# Patient Record
Sex: MALE | Race: ASIAN | URBAN - METROPOLITAN AREA
[De-identification: names, ages, dates, MRNs, and addresses within clinical notes are randomized per-mention and may not be internally consistent; named-entity substitution may affect disease eponyms.]

---

## 2018-08-06 ENCOUNTER — APPOINTMENT (RX ONLY)
Dept: URBAN - METROPOLITAN AREA CLINIC 81 | Facility: CLINIC | Age: 33
Setting detail: DERMATOLOGY
End: 2018-08-06

## 2018-08-06 DIAGNOSIS — L26 EXFOLIATIVE DERMATITIS: ICD-10-CM

## 2018-08-06 PROBLEM — I10 ESSENTIAL (PRIMARY) HYPERTENSION: Status: ACTIVE | Noted: 2018-08-06

## 2018-08-06 PROBLEM — L29.8 OTHER PRURITUS: Status: ACTIVE | Noted: 2018-08-06

## 2018-08-06 PROCEDURE — 99202 OFFICE O/P NEW SF 15 MIN: CPT

## 2018-08-06 PROCEDURE — ? COUNSELING

## 2018-08-06 ASSESSMENT — LOCATION DETAILED DESCRIPTION DERM
LOCATION DETAILED: LEFT RADIAL PALM
LOCATION DETAILED: LEFT MEDIAL PLANTAR MIDFOOT
LOCATION DETAILED: RIGHT ULNAR PALM
LOCATION DETAILED: RIGHT MEDIAL PLANTAR MIDFOOT

## 2018-08-06 ASSESSMENT — LOCATION ZONE DERM
LOCATION ZONE: FEET
LOCATION ZONE: HAND

## 2018-08-06 ASSESSMENT — LOCATION SIMPLE DESCRIPTION DERM
LOCATION SIMPLE: LEFT HAND
LOCATION SIMPLE: RIGHT HAND
LOCATION SIMPLE: RIGHT PLANTAR SURFACE
LOCATION SIMPLE: LEFT PLANTAR SURFACE

## 2018-08-06 NOTE — PROCEDURE: COUNSELING
Patient Specific Counseling (Will Not Stick From Patient To Patient): Recommend Dove Sensitive Skin soap. Samples Cerave and Eucerin moisturizer given. Sample Impoyz given to help calm down inflammation. Patient to use it maximum of twice a day. Patient recommended to get cotton gloves and put good layer of Vaseline and moisturizer at night to help hydrate new skin. RTC no improvement 2-3 weeks or if exfoliation progresses or continues.
Detail Level: Zone